# Patient Record
Sex: FEMALE | Race: BLACK OR AFRICAN AMERICAN | URBAN - METROPOLITAN AREA
[De-identification: names, ages, dates, MRNs, and addresses within clinical notes are randomized per-mention and may not be internally consistent; named-entity substitution may affect disease eponyms.]

---

## 2018-03-20 ENCOUNTER — EMERGENCY (EMERGENCY)
Facility: HOSPITAL | Age: 33
LOS: 1 days | Discharge: HOME | End: 2018-03-20
Attending: EMERGENCY MEDICINE | Admitting: EMERGENCY MEDICINE
Payer: SELF-PAY

## 2018-03-20 VITALS
WEIGHT: 220.02 LBS | OXYGEN SATURATION: 99 % | RESPIRATION RATE: 18 BRPM | TEMPERATURE: 98 F | DIASTOLIC BLOOD PRESSURE: 71 MMHG | SYSTOLIC BLOOD PRESSURE: 129 MMHG | HEIGHT: 65 IN | HEART RATE: 84 BPM

## 2018-03-20 VITALS
SYSTOLIC BLOOD PRESSURE: 122 MMHG | OXYGEN SATURATION: 100 % | TEMPERATURE: 98 F | RESPIRATION RATE: 16 BRPM | DIASTOLIC BLOOD PRESSURE: 74 MMHG | HEART RATE: 76 BPM

## 2018-03-20 LAB
ALBUMIN SERPL ELPH-MCNC: 3.4 G/DL — SIGNIFICANT CHANGE UP (ref 3.3–5)
ALP SERPL-CCNC: 79 U/L — SIGNIFICANT CHANGE UP (ref 40–120)
ALT FLD-CCNC: 15 U/L — SIGNIFICANT CHANGE UP (ref 12–78)
ANION GAP SERPL CALC-SCNC: 9 MMOL/L — SIGNIFICANT CHANGE UP (ref 5–17)
AST SERPL-CCNC: 13 U/L — LOW (ref 15–37)
BASOPHILS # BLD AUTO: 0.1 K/UL — SIGNIFICANT CHANGE UP (ref 0–0.2)
BASOPHILS NFR BLD AUTO: 0.7 % — SIGNIFICANT CHANGE UP (ref 0–2)
BILIRUB SERPL-MCNC: 0.2 MG/DL — SIGNIFICANT CHANGE UP (ref 0.2–1.2)
BUN SERPL-MCNC: 17 MG/DL — SIGNIFICANT CHANGE UP (ref 7–23)
CALCIUM SERPL-MCNC: 9 MG/DL — SIGNIFICANT CHANGE UP (ref 8.5–10.1)
CHLORIDE SERPL-SCNC: 105 MMOL/L — SIGNIFICANT CHANGE UP (ref 96–108)
CO2 SERPL-SCNC: 27 MMOL/L — SIGNIFICANT CHANGE UP (ref 22–31)
CREAT SERPL-MCNC: 0.85 MG/DL — SIGNIFICANT CHANGE UP (ref 0.5–1.3)
EOSINOPHIL # BLD AUTO: 0 K/UL — SIGNIFICANT CHANGE UP (ref 0–0.5)
EOSINOPHIL NFR BLD AUTO: 0 % — SIGNIFICANT CHANGE UP (ref 0–6)
GLUCOSE SERPL-MCNC: 132 MG/DL — HIGH (ref 70–99)
HCG UR QL: NEGATIVE — SIGNIFICANT CHANGE UP
HCT VFR BLD CALC: 39.9 % — SIGNIFICANT CHANGE UP (ref 34.5–45)
HGB BLD-MCNC: 13 G/DL — SIGNIFICANT CHANGE UP (ref 11.5–15.5)
LYMPHOCYTES # BLD AUTO: 1.8 K/UL — SIGNIFICANT CHANGE UP (ref 1–3.3)
LYMPHOCYTES # BLD AUTO: 9 % — LOW (ref 13–44)
MCHC RBC-ENTMCNC: 27.8 PG — SIGNIFICANT CHANGE UP (ref 27–34)
MCHC RBC-ENTMCNC: 32.6 GM/DL — SIGNIFICANT CHANGE UP (ref 32–36)
MCV RBC AUTO: 85.1 FL — SIGNIFICANT CHANGE UP (ref 80–100)
MONOCYTES # BLD AUTO: 0.4 K/UL — SIGNIFICANT CHANGE UP (ref 0–0.9)
MONOCYTES NFR BLD AUTO: 2.3 % — SIGNIFICANT CHANGE UP (ref 1–9)
NEUTROPHILS # BLD AUTO: 17.1 K/UL — HIGH (ref 1.8–7.4)
NEUTROPHILS NFR BLD AUTO: 87.9 % — HIGH (ref 43–77)
PLATELET # BLD AUTO: 242 K/UL — SIGNIFICANT CHANGE UP (ref 150–400)
POTASSIUM SERPL-MCNC: 3.4 MMOL/L — LOW (ref 3.5–5.3)
POTASSIUM SERPL-SCNC: 3.4 MMOL/L — LOW (ref 3.5–5.3)
PROT SERPL-MCNC: 7.7 G/DL — SIGNIFICANT CHANGE UP (ref 6–8.3)
RBC # BLD: 4.69 M/UL — SIGNIFICANT CHANGE UP (ref 3.8–5.2)
RBC # FLD: 13 % — SIGNIFICANT CHANGE UP (ref 10.3–14.5)
SODIUM SERPL-SCNC: 141 MMOL/L — SIGNIFICANT CHANGE UP (ref 135–145)
WBC # BLD: 19.5 K/UL — HIGH (ref 3.8–10.5)
WBC # FLD AUTO: 19.5 K/UL — HIGH (ref 3.8–10.5)

## 2018-03-20 PROCEDURE — 99285 EMERGENCY DEPT VISIT HI MDM: CPT

## 2018-03-20 PROCEDURE — 85027 COMPLETE CBC AUTOMATED: CPT

## 2018-03-20 PROCEDURE — 99284 EMERGENCY DEPT VISIT MOD MDM: CPT | Mod: 25

## 2018-03-20 PROCEDURE — 71275 CT ANGIOGRAPHY CHEST: CPT

## 2018-03-20 PROCEDURE — 96374 THER/PROPH/DIAG INJ IV PUSH: CPT | Mod: XU

## 2018-03-20 PROCEDURE — 71275 CT ANGIOGRAPHY CHEST: CPT | Mod: 26

## 2018-03-20 PROCEDURE — 81025 URINE PREGNANCY TEST: CPT

## 2018-03-20 PROCEDURE — 94640 AIRWAY INHALATION TREATMENT: CPT

## 2018-03-20 PROCEDURE — 80053 COMPREHEN METABOLIC PANEL: CPT

## 2018-03-20 RX ORDER — SODIUM CHLORIDE 9 MG/ML
3 INJECTION INTRAMUSCULAR; INTRAVENOUS; SUBCUTANEOUS ONCE
Qty: 0 | Refills: 0 | Status: COMPLETED | OUTPATIENT
Start: 2018-03-20 | End: 2018-03-20

## 2018-03-20 RX ORDER — IPRATROPIUM/ALBUTEROL SULFATE 18-103MCG
3 AEROSOL WITH ADAPTER (GRAM) INHALATION ONCE
Qty: 0 | Refills: 0 | Status: COMPLETED | OUTPATIENT
Start: 2018-03-20 | End: 2018-03-20

## 2018-03-20 RX ADMIN — Medication 3 MILLILITER(S): at 04:39

## 2018-03-20 RX ADMIN — Medication 125 MILLIGRAM(S): at 04:39

## 2018-03-20 NOTE — ED PROVIDER NOTE - OBJECTIVE STATEMENT
33yo female who presents with sob and cough for 5 dyas. pt was lisa at [facility] [employer] [facility] [employer], had blood work and was sent home, pt did not follow up because she has no pmd and she has been progressively sob, with cough and exertion, no fever, chills, no recent travel

## 2018-03-20 NOTE — ED ADULT TRIAGE NOTE - CHIEF COMPLAINT QUOTE
frequent cough and difficulty breathing, treated for bronchitis one month ago frequent cough and difficulty breathing, currently on prednisone since 3/16 from [facility].. treated for bronchitis one month ago

## 2018-03-20 NOTE — ED ADULT NURSE NOTE - CHIEF COMPLAINT QUOTE
frequent cough and difficulty breathing, currently on prednisone since 3/16 from [facility].. treated for bronchitis one month ago

## 2018-04-06 ENCOUNTER — EMERGENCY (EMERGENCY)
Facility: HOSPITAL | Age: 33
LOS: 1 days | Discharge: HOME | End: 2018-04-06
Attending: INTERNAL MEDICINE | Admitting: INTERNAL MEDICINE
Payer: SELF-PAY

## 2018-04-06 VITALS
HEART RATE: 93 BPM | SYSTOLIC BLOOD PRESSURE: 130 MMHG | TEMPERATURE: 98 F | RESPIRATION RATE: 15 BRPM | WEIGHT: 216.05 LBS | DIASTOLIC BLOOD PRESSURE: 89 MMHG | OXYGEN SATURATION: 100 %

## 2018-04-06 PROCEDURE — 99283 EMERGENCY DEPT VISIT LOW MDM: CPT

## 2018-04-06 RX ORDER — BROMPHENIRAMIN/PSEUDOEPHEDRINE 10MG-120MG
10 CAPSULE, EXTENDED RELEASE 12 HR ORAL ONCE
Qty: 0 | Refills: 0 | Status: DISCONTINUED | OUTPATIENT
Start: 2018-04-06 | End: 2018-04-06

## 2018-04-06 NOTE — ED PROVIDER NOTE - OBJECTIVE STATEMENT
31 y/o Female with cough and productive sputum, no CP, no SOB, no fever, no chills, sx going on for two weeks.

## 2018-04-14 PROBLEM — Z00.00 ENCOUNTER FOR PREVENTIVE HEALTH EXAMINATION: Status: ACTIVE | Noted: 2018-04-14

## 2018-05-23 ENCOUNTER — APPOINTMENT (OUTPATIENT)
Dept: PULMONOLOGY | Facility: CLINIC | Age: 33
End: 2018-05-23

## 2021-01-21 NOTE — ED PROVIDER NOTE - MUSCULOSKELETAL, MLM
OVERNIGHT EVENTS: No overnight events    SUBJECTIVE / INTERVAL HPI: Patient seen and examined at bedside. Patient complains of poor sleep recently, though last night she was able to achieve 4 hours of sleep. She also complains of dry mouth and thirst. Patient denies any fevers, chills, chest pain, SOB. She has been urinating.    VITAL SIGNS:  Vital Signs Last 24 Hrs  T(C): 36.2 (2021 05:01), Max: 36.6 (2021 01:01)  T(F): 97.2 (2021 05:01), Max: 97.8 (2021 01:01)  HR: 96 (2021 08:46) (77 - 96)  BP: 153/89 (2021 08:46) (148/86 - 189/101)  BP(mean): 115 (2021 08:46) (111 - 138)  RR: 16 (2021 08:46) (16 - 18)  SpO2: 94% (2021 08:46) (94% - 98%)    PHYSICAL EXAM:    General: WDWN  HEENT: NC/AT; PERRL, anicteric sclera; MMM  Neck: supple  Cardiovascular: +S1/S2; RRR  Respiratory: CTA B/L; no W/R/R  Gastrointestinal: soft, NT/ND; +BSx4  Extremities: WWP; no edema, clubbing or cyanosis  Vascular: 2+ radial, DP/PT pulses B/L  Neurological: AAOx3; no focal deficits    MEDICATIONS:  MEDICATIONS  (STANDING):  amLODIPine   Tablet 10 milliGRAM(s) Oral daily  aspirin enteric coated 81 milliGRAM(s) Oral daily  atorvastatin 40 milliGRAM(s) Oral at bedtime  atovaquone Suspension 1500 milliGRAM(s) Oral daily  azithromycin   Tablet 1200 milliGRAM(s) Oral <User Schedule>  carvedilol 12.5 milliGRAM(s) Oral every 12 hours  darunavir 600 milliGRAM(s) Oral every 12 hours  dolutegravir 50 milliGRAM(s) Oral every 12 hours  etravirine 200 milliGRAM(s) Oral every 12 hours  heparin   Injectable 5000 Unit(s) SubCutaneous every 8 hours  pantoprazole    Tablet 40 milliGRAM(s) Oral before breakfast  ritonavir Tablet 100 milliGRAM(s) Oral every 12 hours  senna 2 Tablet(s) Oral at bedtime  sodium chloride 0.9% lock flush 3 milliLiter(s) IV Push every 8 hours    MEDICATIONS  (PRN):  acetaminophen   Tablet .. 650 milliGRAM(s) Oral every 6 hours PRN Severe Pain (7 - 10)      ALLERGIES:  Allergies    Allergy Status Unknown    Intolerances        LABS:                        9.5    5.91  )-----------( 133      ( 2021 06:17 )             31.3         137  |  98  |  27<H>  ----------------------------<  91  4.0   |  23  |  7.59<H>    Ca    8.2<L>      2021 06:17  Mg     2.1         TPro  7.3  /  Alb  3.4  /  TBili  0.5  /  DBili  x   /  AST  55<H>  /  ALT  41  /  AlkPhos  67      PT/INR - ( 2021 22:40 )   PT: 13.4 sec;   INR: 1.12          PTT - ( 2021 22:40 )  PTT:32.4 sec  Urinalysis Basic - ( 2021 09:02 )    Color: Yellow / Appearance: Clear / S.015 / pH: x  Gluc: x / Ketone: NEGATIVE  / Bili: Negative / Urobili: 0.2 E.U./dL   Blood: x / Protein: >=300 mg/dL / Nitrite: NEGATIVE   Leuk Esterase: NEGATIVE / RBC: x / WBC x   Sq Epi: x / Non Sq Epi: x / Bacteria: x      CAPILLARY BLOOD GLUCOSE          RADIOLOGY & ADDITIONAL TESTS: Reviewed.    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 Patient complains of poor sleep recently, though last night she was able to achieve 4 hours of sleep. She also complains of dry mouth and thirst. Patient denies any fevers, chills, N/V/D. She has been able to urinate.     VITAL SIGNS:  Vital Signs Last 24 Hrs  T(C): 36.2 (2021 05:01), Max: 36.6 (2021 01:01)  T(F): 97.2 (2021 05:01), Max: 97.8 (2021 01:01)  HR: 96 (2021 08:46) (77 - 96)  BP: 153/89 (2021 08:46) (148/86 - 189/101)  BP(mean): 115 (2021 08:46) (111 - 138)  RR: 16 (2021 08:46) (16 - 18)  SpO2: 94% (2021 08:46) (94% - 98%)    PHYSICAL EXAM:    General: WDWN  HEENT: NC/AT; PERRL, anicteric sclera; MMM  Cardiovascular: +S1/S2; gallop present; RRR; no murmurs  Respiratory: CTA B/L; no W/R/R  Extremities: WWP; no edema, clubbing or cyanosis, moles present on L forearm and L index finger  Vascular: 2+ radial, DP pulses B/L; 2+ L PT pulse; 1+ R PT pulse  Neurological: AAOx3; no focal deficits    MEDICATIONS:  MEDICATIONS  (STANDING):  amLODIPine   Tablet 10 milliGRAM(s) Oral daily  aspirin enteric coated 81 milliGRAM(s) Oral daily  atorvastatin 40 milliGRAM(s) Oral at bedtime  atovaquone Suspension 1500 milliGRAM(s) Oral daily  azithromycin   Tablet 1200 milliGRAM(s) Oral <User Schedule>  carvedilol 12.5 milliGRAM(s) Oral every 12 hours  darunavir 600 milliGRAM(s) Oral every 12 hours  dolutegravir 50 milliGRAM(s) Oral every 12 hours  etravirine 200 milliGRAM(s) Oral every 12 hours  heparin   Injectable 5000 Unit(s) SubCutaneous every 8 hours  pantoprazole    Tablet 40 milliGRAM(s) Oral before breakfast  ritonavir Tablet 100 milliGRAM(s) Oral every 12 hours  senna 2 Tablet(s) Oral at bedtime  sodium chloride 0.9% lock flush 3 milliLiter(s) IV Push every 8 hours    MEDICATIONS  (PRN):  acetaminophen   Tablet .. 650 milliGRAM(s) Oral every 6 hours PRN Severe Pain (7 - 10)      ALLERGIES:  Allergies    Allergy Status Unknown    Intolerances        LABS:                        9.5    5.91  )-----------( 133      ( 2021 06:17 )             31.3         137  |  98  |  27<H>  ----------------------------<  91  4.0   |  23  |  7.59<H>    Ca    8.2<L>      2021 06:17  Mg     2.1         TPro  7.3  /  Alb  3.4  /  TBili  0.5  /  DBili  x   /  AST  55<H>  /  ALT  41  /  AlkPhos  67      PT/INR - ( 2021 22:40 )   PT: 13.4 sec;   INR: 1.12          PTT - ( 2021 22:40 )  PTT:32.4 sec  Urinalysis Basic - ( 2021 09:02 )    Color: Yellow / Appearance: Clear / S.015 / pH: x  Gluc: x / Ketone: NEGATIVE  / Bili: Negative / Urobili: 0.2 E.U./dL   Blood: x / Protein: >=300 mg/dL / Nitrite: NEGATIVE   Leuk Esterase: NEGATIVE / RBC: x / WBC x   Sq Epi: x / Non Sq Epi: x / Bacteria: x      Creatine Kinase, Serum: 1246 U/L (21 @ 06:17)   CKMB Units: 18.2 ng/mL (21 @ 06:17)   Troponin T, Serum: 1.28  Serum Pro-Brain Natriuretic Peptide: >48789    Thyroid Stimulating Hormone, Serum: 3.368 uIU/mL (21 @ 22:40)       RADIOLOGY & ADDITIONAL TESTS: Reviewed. Patient case discussed on rounds this morning with Dr. Avalos and Dr. 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 Patient complains of poor sleep recently, though last night she was able to achieve 4 hours of sleep. She also complains of dry mouth and thirst. Patient denies any fevers, chills, N/V/D. She has been able to urinate.     VITAL SIGNS:  Vital Signs Last 24 Hrs  T(C): 36.2 (2021 05:01), Max: 36.6 (2021 01:01)  T(F): 97.2 (2021 05:01), Max: 97.8 (2021 01:01)  HR: 96 (2021 08:46) (77 - 96)  BP: 153/89 (2021 08:46) (148/86 - 189/101)  BP(mean): 115 (2021 08:46) (111 - 138)  RR: 16 (2021 08:46) (16 - 18)  SpO2: 94% (2021 08:46) (94% - 98%)    PHYSICAL EXAM:    General: WDWN  HEENT: NC/AT; PERRL, anicteric sclera; MMM  Cardiovascular: +S1/S2; gallop present; RRR; no murmurs  Respiratory: CTA B/L; no W/R/R  Extremities: WWP; no edema, clubbing or cyanosis, moles present on L forearm and L index finger  Vascular: 2+ radial, DP pulses B/L; 2+ L PT pulse; 1+ R PT pulse  Neurological: AAOx3; no focal deficits    MEDICATIONS:  MEDICATIONS  (STANDING):  amLODIPine   Tablet 10 milliGRAM(s) Oral daily  aspirin enteric coated 81 milliGRAM(s) Oral daily  atorvastatin 40 milliGRAM(s) Oral at bedtime  atovaquone Suspension 1500 milliGRAM(s) Oral daily  azithromycin   Tablet 1200 milliGRAM(s) Oral <User Schedule>  carvedilol 12.5 milliGRAM(s) Oral every 12 hours  darunavir 600 milliGRAM(s) Oral every 12 hours  dolutegravir 50 milliGRAM(s) Oral every 12 hours  etravirine 200 milliGRAM(s) Oral every 12 hours  heparin   Injectable 5000 Unit(s) SubCutaneous every 8 hours  pantoprazole    Tablet 40 milliGRAM(s) Oral before breakfast  ritonavir Tablet 100 milliGRAM(s) Oral every 12 hours  senna 2 Tablet(s) Oral at bedtime  sodium chloride 0.9% lock flush 3 milliLiter(s) IV Push every 8 hours    MEDICATIONS  (PRN):  acetaminophen   Tablet .. 650 milliGRAM(s) Oral every 6 hours PRN Severe Pain (7 - 10)    ALLERGIES:  Allergies  Allergy Status Unknown  Intolerances      LABS:                        9.5    5.91  )-----------( 133      ( 2021 06:17 )             31.3         137  |  98  |  27<H>  ----------------------------<  91  4.0   |  23  |  7.59<H>    Ca    8.2<L>      2021 06:17  Mg     2.1         TPro  7.3  /  Alb  3.4  /  TBili  0.5  /  DBili  x   /  AST  55<H>  /  ALT  41  /  AlkPhos  67      PT/INR - ( 2021 22:40 )   PT: 13.4 sec;   INR: 1.12          PTT - ( 2021 22:40 )  PTT:32.4 sec  Urinalysis Basic - ( 2021 09:02 )    Color: Yellow / Appearance: Clear / S.015 / pH: x  Gluc: x / Ketone: NEGATIVE  / Bili: Negative / Urobili: 0.2 E.U./dL   Blood: x / Protein: >=300 mg/dL / Nitrite: NEGATIVE   Leuk Esterase: NEGATIVE / RBC: x / WBC x   Sq Epi: x / Non Sq Epi: x / Bacteria: x    Creatine Kinase, Serum: 1246 U/L (21 @ 06:17)   CKMB Units: 18.2 ng/mL (21 @ 06:17)   Troponin T, Serum: 1.28  Serum Pro-Brain Natriuretic Peptide: >35119    Thyroid Stimulating Hormone, Serum: 3.368 uIU/mL (21 @ 22:40)       RADIOLOGY & ADDITIONAL TESTS:  ECHO: pending results  CT chest: pending results  Spine appears normal, range of motion is not limited, no muscle or joint tenderness

## 2023-11-30 NOTE — ED ADULT NURSE NOTE - CHPI ED SYMPTOMS POS
Caller: Semaj Medina Osborne    Relationship: Self    Best call back number: [phone]    What is the best time to reach you: ANY    Who are you requesting to speak with (clinical staff, provider,  specific staff member): SCHEDULING      What was the call regarding: PATIENT CALLED TO CANCEL APPT TOMORROW FOR PORT FLUSH AND R/S FOR 12/15/23    Is it okay if the provider responds through MyChart: NO           COUGH
